# Patient Record
Sex: MALE | Race: WHITE | Employment: FULL TIME | URBAN - METROPOLITAN AREA
[De-identification: names, ages, dates, MRNs, and addresses within clinical notes are randomized per-mention and may not be internally consistent; named-entity substitution may affect disease eponyms.]

---

## 2021-02-12 ENCOUNTER — APPOINTMENT (OUTPATIENT)
Dept: GENERAL RADIOLOGY | Age: 66
End: 2021-02-12
Attending: EMERGENCY MEDICINE
Payer: OTHER MISCELLANEOUS

## 2021-02-12 ENCOUNTER — APPOINTMENT (OUTPATIENT)
Dept: CT IMAGING | Age: 66
End: 2021-02-12
Attending: NURSE PRACTITIONER
Payer: OTHER MISCELLANEOUS

## 2021-02-12 ENCOUNTER — APPOINTMENT (OUTPATIENT)
Dept: CT IMAGING | Age: 66
End: 2021-02-12
Attending: EMERGENCY MEDICINE
Payer: OTHER MISCELLANEOUS

## 2021-02-12 ENCOUNTER — HOSPITAL ENCOUNTER (OUTPATIENT)
Age: 66
Setting detail: OBSERVATION
Discharge: HOME | End: 2021-02-14
Attending: COLON & RECTAL SURGERY | Admitting: COLON & RECTAL SURGERY
Payer: OTHER MISCELLANEOUS

## 2021-02-12 DIAGNOSIS — S22.42XA MULTIPLE FRACTURES OF RIBS, LEFT SIDE, INITIAL ENCOUNTER FOR CLOSED FRACTURE: ICD-10-CM

## 2021-02-12 DIAGNOSIS — S22.42XA CLOSED FRACTURE OF MULTIPLE RIBS OF LEFT SIDE, INITIAL ENCOUNTER: ICD-10-CM
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
## 2021-02-12 DIAGNOSIS — S40.812A ABRASION OF LEFT UPPER EXTREMITY, INITIAL ENCOUNTER: ICD-10-CM

## 2021-02-12 LAB
ALBUMIN SERPL-MCNC: 3.6 G/DL (ref 3.5–5)
ALBUMIN/GLOB SERPL: 0.9 {RATIO} (ref 1.1–2.2)
ALP SERPL-CCNC: 99 U/L (ref 45–117)
ALT SERPL-CCNC: 18 U/L (ref 12–78)
ANION GAP SERPL CALC-SCNC: 6 MMOL/L (ref 5–15)
APPEARANCE UR: CLEAR
AST SERPL W P-5'-P-CCNC: 10 U/L (ref 15–37)
BACTERIA URNS QL MICRO: NEGATIVE /HPF
BASOPHILS # BLD: 0.1 K/UL (ref 0–0.1)
BASOPHILS NFR BLD: 0 % (ref 0–1)
BILIRUB SERPL-MCNC: 0.4 MG/DL (ref 0.2–1)
BILIRUB UR QL: NEGATIVE
BUN SERPL-MCNC: 21 MG/DL (ref 6–20)
BUN/CREAT SERPL: 20 (ref 12–20)
CA-I BLD-MCNC: 9.7 MG/DL (ref 8.5–10.1)
CHLORIDE SERPL-SCNC: 107 MMOL/L (ref 97–108)
CO2 SERPL-SCNC: 25 MMOL/L (ref 21–32)
COLOR UR: ABNORMAL
CREAT SERPL-MCNC: 1.03 MG/DL (ref 0.7–1.3)
DIFFERENTIAL METHOD BLD: ABNORMAL
EOSINOPHIL # BLD: 0.3 K/UL (ref 0–0.4)
EOSINOPHIL NFR BLD: 2 % (ref 0–7)
ERYTHROCYTE [DISTWIDTH] IN BLOOD BY AUTOMATED COUNT: 13.6 % (ref 11.5–14.5)
GLOBULIN SER CALC-MCNC: 3.8 G/DL (ref 2–4)
GLUCOSE SERPL-MCNC: 278 MG/DL (ref 65–100)
GLUCOSE UR STRIP.AUTO-MCNC: >300 MG/DL
HCT VFR BLD AUTO: 42.8 % (ref 36.6–50.3)
HGB BLD-MCNC: 14.5 G/DL (ref 12.1–17)
HGB UR QL STRIP: ABNORMAL
IMM GRANULOCYTES # BLD AUTO: 0.1 K/UL (ref 0–0.04)
IMM GRANULOCYTES NFR BLD AUTO: 0 % (ref 0–0.5)
KETONES UR QL STRIP.AUTO: NEGATIVE MG/DL
LEUKOCYTE ESTERASE UR QL STRIP.AUTO: NEGATIVE
LYMPHOCYTES # BLD: 2.7 K/UL (ref 0.8–3.5)
LYMPHOCYTES NFR BLD: 18 % (ref 12–49)
MCH RBC QN AUTO: 30.9 PG (ref 26–34)
MCHC RBC AUTO-ENTMCNC: 33.9 G/DL (ref 30–36.5)
MCV RBC AUTO: 91.1 FL (ref 80–99)
MONOCYTES # BLD: 1.1 K/UL (ref 0–1)
MONOCYTES NFR BLD: 7 % (ref 5–13)
MUCOUS THREADS URNS QL MICRO: ABNORMAL /LPF
NEUTS SEG # BLD: 11.5 K/UL (ref 1.8–8)
NEUTS SEG NFR BLD: 73 % (ref 32–75)
NITRITE UR QL STRIP.AUTO: NEGATIVE
PH UR STRIP: 6 [PH] (ref 5–8)
PLATELET # BLD AUTO: 191 K/UL (ref 150–400)
PMV BLD AUTO: 13.5 FL (ref 8.9–12.9)
POTASSIUM SERPL-SCNC: 4.1 MMOL/L (ref 3.5–5.1)
PROT SERPL-MCNC: 7.4 G/DL (ref 6.4–8.2)
PROT UR STRIP-MCNC: 30 MG/DL
RBC # BLD AUTO: 4.7 M/UL (ref 4.1–5.7)
RBC #/AREA URNS HPF: >100 /HPF (ref 0–5)
SODIUM SERPL-SCNC: 138 MMOL/L (ref 136–145)
SP GR UR REFRACTOMETRY: 1.02 (ref 1–1.03)
TROPONIN I SERPL-MCNC: <0.05 NG/ML
UROBILINOGEN UR QL STRIP.AUTO: 2 EU/DL (ref 0.1–1)
WBC # BLD AUTO: 15.6 K/UL (ref 4.1–11.1)
WBC URNS QL MICRO: ABNORMAL /HPF (ref 0–4)

## 2021-02-12 PROCEDURE — 73110 X-RAY EXAM OF WRIST: CPT

## 2021-02-12 PROCEDURE — 71250 CT THORAX DX C-: CPT

## 2021-02-12 PROCEDURE — 81001 URINALYSIS AUTO W/SCOPE: CPT

## 2021-02-12 PROCEDURE — 74011250636 HC RX REV CODE- 250/636: Performed by: EMERGENCY MEDICINE

## 2021-02-12 PROCEDURE — 99285 EMERGENCY DEPT VISIT HI MDM: CPT

## 2021-02-12 PROCEDURE — 90715 TDAP VACCINE 7 YRS/> IM: CPT | Performed by: NURSE PRACTITIONER

## 2021-02-12 PROCEDURE — 73090 X-RAY EXAM OF FOREARM: CPT

## 2021-02-12 PROCEDURE — 84484 ASSAY OF TROPONIN QUANT: CPT

## 2021-02-12 PROCEDURE — 96375 TX/PRO/DX INJ NEW DRUG ADDON: CPT

## 2021-02-12 PROCEDURE — 74011250636 HC RX REV CODE- 250/636: Performed by: NURSE PRACTITIONER

## 2021-02-12 PROCEDURE — 90471 IMMUNIZATION ADMIN: CPT

## 2021-02-12 PROCEDURE — 93005 ELECTROCARDIOGRAM TRACING: CPT

## 2021-02-12 PROCEDURE — 85025 COMPLETE CBC W/AUTO DIFF WBC: CPT

## 2021-02-12 PROCEDURE — 99218 HC RM OBSERVATION: CPT

## 2021-02-12 PROCEDURE — 96374 THER/PROPH/DIAG INJ IV PUSH: CPT

## 2021-02-12 PROCEDURE — 72131 CT LUMBAR SPINE W/O DYE: CPT

## 2021-02-12 PROCEDURE — 80053 COMPREHEN METABOLIC PANEL: CPT

## 2021-02-12 RX ORDER — HYDROMORPHONE HYDROCHLORIDE 1 MG/ML
1 INJECTION, SOLUTION INTRAMUSCULAR; INTRAVENOUS; SUBCUTANEOUS ONCE
Status: COMPLETED | OUTPATIENT
Start: 2021-02-12 | End: 2021-02-12

## 2021-02-12 RX ORDER — ONDANSETRON 2 MG/ML
4 INJECTION INTRAMUSCULAR; INTRAVENOUS
Status: COMPLETED | OUTPATIENT
Start: 2021-02-12 | End: 2021-02-12

## 2021-02-12 RX ORDER — PIOGLITAZONEHYDROCHLORIDE 45 MG/1
45 TABLET ORAL DAILY
COMMUNITY

## 2021-02-12 RX ORDER — SODIUM CHLORIDE 9 MG/ML
125 INJECTION, SOLUTION INTRAVENOUS CONTINUOUS
Status: DISCONTINUED | OUTPATIENT
Start: 2021-02-12 | End: 2021-02-14 | Stop reason: HOSPADM

## 2021-02-12 RX ORDER — CARVEDILOL 12.5 MG/1
12.5 TABLET ORAL 2 TIMES DAILY WITH MEALS
COMMUNITY

## 2021-02-12 RX ORDER — HYDROMORPHONE HYDROCHLORIDE 1 MG/ML
1 INJECTION, SOLUTION INTRAMUSCULAR; INTRAVENOUS; SUBCUTANEOUS
Status: COMPLETED | OUTPATIENT
Start: 2021-02-12 | End: 2021-02-13

## 2021-02-12 RX ORDER — ERGOCALCIFEROL 1.25 MG/1
50000 CAPSULE ORAL
COMMUNITY

## 2021-02-12 RX ORDER — HYDROMORPHONE HYDROCHLORIDE 1 MG/ML
0.5 INJECTION, SOLUTION INTRAMUSCULAR; INTRAVENOUS; SUBCUTANEOUS
Status: DISCONTINUED | OUTPATIENT
Start: 2021-02-12 | End: 2021-02-13

## 2021-02-12 RX ORDER — MORPHINE SULFATE 4 MG/ML
4 INJECTION INTRAVENOUS ONCE
Status: COMPLETED | OUTPATIENT
Start: 2021-02-12 | End: 2021-02-12

## 2021-02-12 RX ORDER — ONDANSETRON 2 MG/ML
4 INJECTION INTRAMUSCULAR; INTRAVENOUS
Status: DISCONTINUED | OUTPATIENT
Start: 2021-02-12 | End: 2021-02-14 | Stop reason: HOSPADM

## 2021-02-12 RX ORDER — METFORMIN HYDROCHLORIDE 500 MG/1
500 TABLET ORAL 2 TIMES DAILY WITH MEALS
COMMUNITY

## 2021-02-12 RX ADMIN — HYDROMORPHONE HYDROCHLORIDE 1 MG: 1 INJECTION, SOLUTION INTRAMUSCULAR; INTRAVENOUS; SUBCUTANEOUS at 20:59

## 2021-02-12 RX ADMIN — MORPHINE SULFATE 4 MG: 4 INJECTION INTRAVENOUS at 18:47

## 2021-02-12 RX ADMIN — TETANUS TOXOID, REDUCED DIPHTHERIA TOXOID AND ACELLULAR PERTUSSIS VACCINE, ADSORBED 0.5 ML: 5; 2.5; 8; 8; 2.5 SUSPENSION INTRAMUSCULAR at 23:53

## 2021-02-12 RX ADMIN — ONDANSETRON 4 MG: 2 INJECTION INTRAMUSCULAR; INTRAVENOUS at 18:45

## 2021-02-12 RX ADMIN — SODIUM CHLORIDE 125 ML/HR: 9 INJECTION, SOLUTION INTRAVENOUS at 23:53

## 2021-02-12 NOTE — ED TRIAGE NOTES
Fall from approx 3-4 ft platform to ground between truck, C/O of mid back pain with respirations. Abrasion to L forearm.

## 2021-02-12 NOTE — LETTER
NOTIFICATION OF RETURN TO WORK / SCHOOL 
 
2/14/2021 10:13 AM 
 
Mr. Juanita Emerson [address] [address] [place] To Whom It May Concern: 
 
Juanita Emerson was under the care of [facility] from February 12, 2021 to February 14, 2021. He will be able to return to work/school on March 29, [address] with no restrictions assuming his recovery is uneventful. [place] If there are questions or concerns please contact our office. Sincerely, Laurel Martino MD PhD [facility] 
 
[phone]

## 2021-02-13 ENCOUNTER — APPOINTMENT (OUTPATIENT)
Dept: GENERAL RADIOLOGY | Age: 66
End: 2021-02-13
Attending: NURSE PRACTITIONER
Payer: OTHER MISCELLANEOUS

## 2021-02-13 ENCOUNTER — APPOINTMENT (OUTPATIENT)
Dept: CT IMAGING | Age: 66
End: 2021-02-13
Attending: COLON & RECTAL SURGERY
Payer: OTHER MISCELLANEOUS

## 2021-02-13 LAB
GLUCOSE BLD STRIP.AUTO-MCNC: 120 MG/DL (ref 65–100)
GLUCOSE BLD STRIP.AUTO-MCNC: 152 MG/DL (ref 65–100)
GLUCOSE BLD STRIP.AUTO-MCNC: 266 MG/DL (ref 65–100)
GLUCOSE BLD STRIP.AUTO-MCNC: 270 MG/DL (ref 65–100)
GLUCOSE BLD STRIP.AUTO-MCNC: 276 MG/DL (ref 65–100)
PERFORMED BY, TECHID: ABNORMAL

## 2021-02-13 PROCEDURE — 82962 GLUCOSE BLOOD TEST: CPT

## 2021-02-13 PROCEDURE — 71045 X-RAY EXAM CHEST 1 VIEW: CPT

## 2021-02-13 PROCEDURE — 74011000636 HC RX REV CODE- 636: Performed by: COLON & RECTAL SURGERY

## 2021-02-13 PROCEDURE — 74011636637 HC RX REV CODE- 636/637: Performed by: COLON & RECTAL SURGERY

## 2021-02-13 PROCEDURE — 71260 CT THORAX DX C+: CPT

## 2021-02-13 PROCEDURE — 99218 HC RM OBSERVATION: CPT

## 2021-02-13 PROCEDURE — 74011250636 HC RX REV CODE- 250/636: Performed by: NURSE PRACTITIONER

## 2021-02-13 PROCEDURE — 96376 TX/PRO/DX INJ SAME DRUG ADON: CPT

## 2021-02-13 PROCEDURE — 94762 N-INVAS EAR/PLS OXIMTRY CONT: CPT

## 2021-02-13 PROCEDURE — 74011250637 HC RX REV CODE- 250/637: Performed by: COLON & RECTAL SURGERY

## 2021-02-13 PROCEDURE — 99218 PR INITIAL OBSERVATION CARE/DAY 30 MINUTES: CPT | Performed by: COLON & RECTAL SURGERY

## 2021-02-13 RX ORDER — PIOGLITAZONEHYDROCHLORIDE 15 MG/1
45 TABLET ORAL DAILY
Status: DISCONTINUED | OUTPATIENT
Start: 2021-02-13 | End: 2021-02-14 | Stop reason: HOSPADM

## 2021-02-13 RX ORDER — METFORMIN HYDROCHLORIDE 500 MG/1
500 TABLET ORAL 2 TIMES DAILY WITH MEALS
Status: DISCONTINUED | OUTPATIENT
Start: 2021-02-13 | End: 2021-02-14 | Stop reason: HOSPADM

## 2021-02-13 RX ORDER — CARVEDILOL 12.5 MG/1
12.5 TABLET ORAL 2 TIMES DAILY WITH MEALS
Status: DISCONTINUED | OUTPATIENT
Start: 2021-02-13 | End: 2021-02-14 | Stop reason: HOSPADM

## 2021-02-13 RX ORDER — OXYCODONE HYDROCHLORIDE 10 MG/1
10 TABLET ORAL
Status: DISCONTINUED | OUTPATIENT
Start: 2021-02-13 | End: 2021-02-14 | Stop reason: HOSPADM

## 2021-02-13 RX ORDER — MAGNESIUM SULFATE 100 %
4 CRYSTALS MISCELLANEOUS AS NEEDED
Status: DISCONTINUED | OUTPATIENT
Start: 2021-02-13 | End: 2021-02-14 | Stop reason: HOSPADM

## 2021-02-13 RX ORDER — OXYCODONE HYDROCHLORIDE 5 MG/1
5 TABLET ORAL
Status: DISCONTINUED | OUTPATIENT
Start: 2021-02-13 | End: 2021-02-14 | Stop reason: HOSPADM

## 2021-02-13 RX ORDER — DEXTROSE 50 % IN WATER (D50W) INTRAVENOUS SYRINGE
25-50 AS NEEDED
Status: DISCONTINUED | OUTPATIENT
Start: 2021-02-13 | End: 2021-02-14 | Stop reason: HOSPADM

## 2021-02-13 RX ORDER — INSULIN LISPRO 100 [IU]/ML
INJECTION, SOLUTION INTRAVENOUS; SUBCUTANEOUS
Status: DISCONTINUED | OUTPATIENT
Start: 2021-02-13 | End: 2021-02-14 | Stop reason: HOSPADM

## 2021-02-13 RX ADMIN — METFORMIN HYDROCHLORIDE 500 MG: 500 TABLET ORAL at 10:32

## 2021-02-13 RX ADMIN — HYDROMORPHONE HYDROCHLORIDE 1 MG: 1 INJECTION, SOLUTION INTRAMUSCULAR; INTRAVENOUS; SUBCUTANEOUS at 02:09

## 2021-02-13 RX ADMIN — INSULIN LISPRO 3 UNITS: 100 INJECTION, SOLUTION INTRAVENOUS; SUBCUTANEOUS at 12:53

## 2021-02-13 RX ADMIN — INSULIN LISPRO 3 UNITS: 100 INJECTION, SOLUTION INTRAVENOUS; SUBCUTANEOUS at 10:32

## 2021-02-13 RX ADMIN — CARVEDILOL 12.5 MG: 12.5 TABLET, FILM COATED ORAL at 18:04

## 2021-02-13 RX ADMIN — SODIUM CHLORIDE 125 ML/HR: 9 INJECTION, SOLUTION INTRAVENOUS at 15:16

## 2021-02-13 RX ADMIN — CARVEDILOL 12.5 MG: 12.5 TABLET, FILM COATED ORAL at 10:32

## 2021-02-13 RX ADMIN — OXYCODONE HYDROCHLORIDE 10 MG: 10 TABLET ORAL at 18:08

## 2021-02-13 RX ADMIN — METFORMIN HYDROCHLORIDE 500 MG: 500 TABLET ORAL at 18:04

## 2021-02-13 RX ADMIN — HYDROMORPHONE HYDROCHLORIDE 1 MG: 1 INJECTION, SOLUTION INTRAMUSCULAR; INTRAVENOUS; SUBCUTANEOUS at 07:39

## 2021-02-13 RX ADMIN — IOPAMIDOL 100 ML: 755 INJECTION, SOLUTION INTRAVENOUS at 15:46

## 2021-02-13 RX ADMIN — INSULIN LISPRO 1 UNITS: 100 INJECTION, SOLUTION INTRAVENOUS; SUBCUTANEOUS at 18:03

## 2021-02-13 RX ADMIN — PIOGLITAZONE 45 MG: 15 TABLET ORAL at 12:18

## 2021-02-13 NOTE — ED NOTES
TRANSFER - OUT REPORT:    Verbal report given to Ulises Branch RN on [employer]  being transferred to [address] [place] [address]. Report consisted of patients Situation, Background, Assessment and   Recommendations(SBAR). Information from the following report(s) SBAR, ED Summary, [facility] and Recent Results was reviewed with the receiving nurse. Lines:   Peripheral IV 02/12/21 Right; Lower Forearm (Active)   Site Assessment Clean, dry, & intact 02/12/21 1844   Phlebitis Assessment 0 02/12/21 1844   Infiltration Assessment 0 02/12/21 1844   Dressing Status Clean, dry, & intact 02/12/21 1844   Dressing Type Tape;Transparent 02/12/21 1844   Hub Color/Line Status Pink 02/12/21 1844        Opportunity for questions and clarification was provided.

## 2021-02-13 NOTE — ROUTINE PROCESS
Pt refused inpatient and outpatient treatment and counseling for smoking cessation. Patient was given the -[phone] number for Shar Thayer.

## 2021-02-13 NOTE — ROUTINE PROCESS
BEDSIDE_VERBAL  shift change report given to Adeel Summers RN (oncoming nurse) by Mena Jeffery RN (offgoing nurse).  Report included the following information from the SBAR and night assessment

## 2021-02-13 NOTE — ED PROVIDER NOTES
EMERGENCY DEPARTMENT HISTORY AND PHYSICAL EXAM      Date: 2/12/2021  Patient Name: Juanita Emerson      History of Presenting Illness     Chief Complaint   Patient presents with   [facility] Fall    Back Pain       History Provided By: Patient    HPI: Juanita Emerson, 63 y.o. male with a past medical history significant No significant past medical history presents to the ED with cc of left thoracic back pain s/p fall. 10/10 constant sharp aggravated with taking a deep breath and movement. patient reports was pulled into the garage bay when he stepped off the trailer onto the ramp and slipped and fell between the trailer and the ramp. He reports fall was about 4 feet from the ground was wearing his hard hat. Denies LOC or hitting his head however fell backwards on his back was unable to get up or stand at the time complains of shortness of breath due to pain. He reports calling out for assistance when another  found him on the ground. Patient reports working for [employer] during time of incident. Denies any fever, chills, nausea, vomiting, abdominal pain. Off note pt current smoker 1ppd    There are no other complaints, changes, or physical findings at this time. PCP: UNKNOWN    Current Facility-Administered Medications   Medication Dose Route Frequency Provider Last Rate Last Admin    0.9% sodium chloride infusion  125 mL/hr IntraVENous CONTINUOUS Cintia Merritt NP        HYDROmorphone (PF) (DILAUDID) injection 0.5 mg  0.5 mg IntraVENous Q4H PRN Elbert Merritt NP        HYDROmorphone (DILAUDID) injection 1 mg  1 mg IntraVENous Q4H PRN Elbert Merritt NP        ondansetron (ZOFRAN) injection 4 mg  4 mg IntraVENous Q4H PRN Edna Fagan NP         Current Outpatient Medications   Medication Sig Dispense Refill    metFORMIN (GLUCOPHAGE) 500 mg tablet Take 500 mg by mouth two (2) times daily (with meals).  pioglitazone (Actos) 45 mg tablet Take 45 mg by mouth daily.       ergocalciferol (Vitamin D2) 1,250 mcg (50,000 unit) capsule Take 50,000 Units by mouth every seven (7) days.  carvediloL (COREG) 12.5 mg tablet Take 12.5 mg by mouth two (2) times daily (with meals). Indications: myocardial reinfarction prevention         Past History     Past Medical History:  Past Medical History:   Diagnosis Date    Diabetes ([facility]     MI (myocardial infarction) ([facility]        Past Surgical History:  Past Surgical History:   Procedure Laterality Date    HX CORONARY STENT PLACEMENT      3 stents       Family History:  History reviewed. No pertinent family history. Social History:  Social History     Tobacco Use    Smoking status: Current Every Day Smoker     Packs/day: 1.00    Smokeless tobacco: Never Used   Substance Use Topics    Alcohol use: Never     Frequency: Never    Drug use: Never       Allergies:  No Known Allergies      Review of Systems     Review of Systems   Constitutional: Negative for chills and fever. Respiratory: Positive for shortness of breath. Negative for wheezing. Cardiovascular: Negative for chest pain. Gastrointestinal: Negative for nausea and vomiting. Genitourinary: Negative. Musculoskeletal: Positive for back pain. Skin: Negative. Physical Exam     Physical Exam  Constitutional:       General: He is in acute distress. Appearance: Normal appearance. HENT:      Head: Normocephalic and atraumatic. Nose: Nose normal.      Mouth/Throat:      Mouth: Mucous membranes are moist.   Eyes:      Extraocular Movements: Extraocular movements intact. Pupils: Pupils are equal, round, and reactive to light. Neck:      Musculoskeletal: Normal range of motion and neck supple. No muscular tenderness. Cardiovascular:      Rate and Rhythm: Normal rate and regular rhythm. Pulses: Normal pulses. Pulmonary:      Effort: Pulmonary effort is normal. No respiratory distress. Breath sounds: Rhonchi present.    Abdominal:      General: Bowel sounds are normal.      Palpations: Abdomen is soft. Musculoskeletal:        Arms:    Skin:     General: Skin is warm and dry. Capillary Refill: Capillary refill takes less than 2 seconds. Findings: Wound present. No bruising or erythema. Neurological:      Mental Status: He is alert and oriented to person, place, and time. Lab and Diagnostic Study Results     Labs -     Recent Results (from the past 12 hour(s))   CBC WITH AUTOMATED DIFF    Collection Time: 02/12/21  8:00 PM   Result Value Ref Range    WBC 15.6 (H) 4.1 - 11.1 K/uL    RBC 4.70 4. 10 - 5.70 M/uL    HGB 14.5 12.1 - 17.0 g/dL    HCT 42.8 36.6 - 50.3 %    MCV 91.1 80.0 - 99.0 FL    MCH 30.9 26.0 - 34.0 PG    MCHC 33.9 30.0 - 36.5 g/dL    RDW 13.6 11.5 - 14.5 %    PLATELET 415 438 - 223 K/uL    MPV 13.5 (H) 8.9 - 12.9 FL    NEUTROPHILS 73 32 - 75 %    LYMPHOCYTES 18 12 - 49 %    MONOCYTES 7 5 - 13 %    EOSINOPHILS 2 0 - 7 %    BASOPHILS 0 0 - 1 %    IMMATURE GRANULOCYTES 0 0.0 - 0.5 %    ABS. NEUTROPHILS 11.5 (H) 1.8 - 8.0 K/UL    ABS. LYMPHOCYTES 2.7 0.8 - 3.5 K/UL    ABS. MONOCYTES 1.1 (H) 0.0 - 1.0 K/UL    ABS. EOSINOPHILS 0.3 0.0 - 0.4 K/UL    ABS. BASOPHILS 0.1 0.0 - 0.1 K/UL    ABS. IMM.  GRANS. 0.1 (H) 0.00 - 0.04 K/UL    DF AUTOMATED     TROPONIN I    Collection Time: 02/12/21  8:00 PM   Result Value Ref Range    Troponin-I, Qt. <0.05 <6.29 ng/mL   METABOLIC PANEL, COMPREHENSIVE    Collection Time: 02/12/21  8:00 PM   Result Value Ref Range    Sodium 138 136 - 145 mmol/L    Potassium 4.1 3.5 - 5.1 mmol/L    Chloride 107 97 - 108 mmol/L    CO2 25 21 - 32 mmol/L    Anion gap 6 5 - 15 mmol/L    Glucose 278 (H) 65 - 100 mg/dL    BUN 21 (H) 6 - 20 mg/dL    Creatinine 1.03 0.70 - 1.30 mg/dL    BUN/Creatinine ratio 20 12 - 20      GFR est AA >60 >60 ml/min/1.73m2    GFR est non-AA >60 >60 ml/min/1.73m2    Calcium 9.7 8.5 - 10.1 mg/dL    Bilirubin, total 0.4 0.2 - 1.0 mg/dL    AST (SGOT) 10 (L) 15 - 37 U/L    ALT (SGPT) 18 12 - 78 U/L    Alk. phosphatase 99 45 - 117 U/L    Protein, total 7.4 6.4 - 8.2 g/dL    Albumin 3.6 3.5 - 5.0 g/dL    Globulin 3.8 2.0 - 4.0 g/dL    A-G Ratio 0.9 (L) 1.1 - 2.2         Radiologic Studies -   [unfilled]  CT Results  (Last 48 hours)               02/12/21 2014  CT CHEST WO CONT Final result    Impression:  1. Nondisplaced fractures of left posterior ninth, 10th, 11th, 12th ribs. 2. Atherosclerosis. 3. Emphysema. Narrative:  Comparison: No comparison. History: Fall. Technique: Axial imaging chest without IV contrast,  with multiplanar formatting   and MIPs. Dose reduction: All CT scans at this facility are performed using dose reduction   optimization techniques as appropriate to a performed exam including the   following: Automated exposure control, adjustments of the mA and/or kV according   to patient's size, or use of iterative reconstruction technique. Findings:       Heart: Normal heart size. Moderate to severe multivessel coronary artery   calcification. No pericardial effusion. Thoracic Aorta: Calcific atherosclerosis. No aneurysm. Pulmonary arteries: Nondilated. Adenopathy: No mediastinal or hilar adenopathy. Thoracic esophagus:Collapsed. Lung parenchyma: Paraseptal emphysema. .       Central airways: Patent. Pleural effusion: None. Chest Wall: No axillary adenopathy. Included thyroid unremarkable. No   significant superficial soft tissue swelling. Upper abdomen:  Cholecystectomy. Calcific atherosclerosis. Bones: Nondisplaced fractures of left posterior ninth, 10th, 11th, 12th ribs. Degenerative disease of the spine. 02/12/21 2013  CT SPINE THORAC LUMB WO CONT Final result    Impression:  1. No acute fracture of the thoracic or lumbar spine. 2.  Posterior left 9-12th rib fractures with adjacent left pleural fluid, likely   hemothorax. Soft tissue stranding within the adjacent lung.  Findings will be   described in detail on dedicated CT chest.   3.  Soft tissue stranding within the subcutaneous tissues at the lower lumbar   spine and sacrum, eccentric to the left, likely posttraumatic soft tissue   contusion. Narrative:  CT OF THE THORACIC SPINE WITHOUT INTRAVENOUS CONTRAST   CT OF THE LUMBAR SPINE WITHOUT INTRAVENOUS CONTRAST       INDICATION: Back pain, fall       TECHNIQUE: Thin section transaxial images were obtained through the thoracic and   lumbar spine. Sagittal and coronal reformations were performed. Dose reduction:   Per department policy, all CT scans at this facility are performed using dose   reduction optimization techniques as appropriate to a performed examination   including the following: Automated exposure control, adjustments of the mA   and/or KV according to patient size, or use of iterative reconstruction   technique. FINDINGS:   Multilevel cervical spondylosis is noted with prominent anterolateral   osteophytes. Thoracic spine   Vertebral body heights are normal. Intervertebral disc heights are preserved. Multilevel anterolateral osteophytes of the mid thoracic spine are seen. No   aggressive osseous lesions. Nondisplaced fracture of the posterior 9th, 10th,   11th and 12th ribs seen with small amount of adjacent pleural fluid and   groundglass opacity of the lungs. Evaluation of the included chest also shows   high density of the coronary vessels, likely from atherosclerotic calcification   and/or stones. Calcified atherosclerosis of the aorta and branches. Mild   paraseptal emphysema of the lung apices. Lumbar spine   Vertebral body heights are normal. Intervertebral disc heights are preserved. No   acute fracture or subluxation. No aggressive osseous lesions. Degenerative   changes of the lumbar spine causes moderate bilateral neural foraminal narrowing   at L5-S1.  Significant soft tissue stranding is seen within the subcutaneous   tissues of the lower lumbar spine and sacrum posteriorly, likely traumatic   contusion. Degenerative changes of the sacroiliac joints bilaterally. CXR Results  (Last 48 hours)    None          Medical Decision Making and ED Course   - I am the first and primary provider for this patient AND AM THE PRIMARY PROVIDER OF RECORD. - I reviewed the vital signs, available nursing notes, past medical history, past surgical history, family history and social history. - Initial assessment performed. The patients presenting problems have been discussed, and the staff are in agreement with the care plan formulated and outlined with them. I have encouraged them to ask questions as they arise throughout their visit. Vital Signs-Reviewed the patient's vital signs. Patient Vitals for the past 12 hrs:   Temp Pulse Resp BP SpO2   02/12/21 2030  86 20 125/66 97 %   02/12/21 1823 98.1 °F (36.7 °C) 83 20 139/81 97 %       Component Value Ref Range & Units Status   Ventricular Rate 86  BPM Final   Atrial Rate 86  BPM Final   P-R Interval 154  ms Final   QRS Duration 92  ms Final   Q-T Interval 354  ms Final   QTC Calculation (Bezet) 423  ms Final   Calculated P Axis 71  degrees Final   Calculated R Axis 66  degrees Final   Calculated T Axis 47  degrees Final   Diagnosis   Final   Normal sinus rhythm   Normal ECG   No previous ECGs available   Confirmed by DEBBI YUN, Abdi Gimenez ([id number]) on 2/14/2021 12:34:46 PM          The patient presents with fall with a differential diagnosis of fracture, pneumothorax    ED Course:              Provider Notes (Medical Decision Making):   TDAP given. Patient with multiple rib fractures noted consult to general surgeon. pt admitted for pain controll. Stable at this time          Consultations:       Consultations: Dr. Yves Wei (General Surgeon.)        Procedures and [address],         Disposition     Disposition:     Admitted    DISCHARGE PLAN:  1.    Current Discharge Medication List      CONTINUE these medications which have NOT CHANGED    Details   metFORMIN (GLUCOPHAGE) 500 mg tablet Take 500 mg by mouth two (2) times daily (with meals). pioglitazone (Actos) 45 mg tablet Take 45 mg by mouth daily. ergocalciferol (Vitamin D2) 1,250 mcg (50,000 unit) capsule Take 50,000 Units by mouth every seven (7) days. carvediloL (COREG) 12.5 mg tablet Take 12.5 mg by mouth two (2) times daily (with meals). Indications: myocardial reinfarction prevention           2. Follow-up Information    None       3. Return to ED if worse   4. Current Discharge Medication List          Diagnosis     Clinical Impression:   1. Fall, initial encounter    2. Closed fracture of multiple ribs of left side, initial encounter        Attestations:    Roseann Cutler NP    Please note that this dictation was completed with [employer] [facility], the computer voice recognition software. Quite often unanticipated grammatical, syntax, homophones, and other interpretive errors are inadvertently transcribed by the computer software. Please disregard these errors. Please excuse any errors that have escaped final proofreading. Thank you.

## 2021-02-13 NOTE — ROUTINE PROCESS
2 person skin assessment completed by Renu Seymour RN and Emigdio Maradiaga RN. Abrasion noted to left arm just below the elbow and an abrasion on the left wrist.  Abrasions were rinsed, covered with ABD pad and wrapped with kerlex. No other wounds or bruising noted.

## 2021-02-13 NOTE — H&P
History and Physical    Subjective:     Alida Chavez is a 63 y.o.  male who presents emergency department complaints of left thoracic/back pain. He fell approximately 4 feet off of a trailer onto a ramp and slipped and fell between the trailer and the ramp. He was wearing a hard hat. He fell on his back and immediately experienced shortness of breath and pain. He also complained of some left arm pain. He had x-rays of the left forearm and left wrist which were negative. He had a CT of the thoracic and lumbar spine which demonstrated no acute fracture of the thoracic or lumbar spine. Posterior left ninth through 12th rib fractures were noted with adjacent pleural fluid. Was also noted there was soft tissue stranding in the subcutaneous tissue the lower lumbar sprain and sacrum  on the left side consistent with posttraumatic soft tissue contusion. He also had a CT of the chest which demonstrated moderate to severe multivessel coronary artery calcification, emphysema of the lungs, nondisplaced fractures of the left posterior ninth 10th 11th and 12th ribs. I personally reviewed the images myself. Is also trace fluid in the left side at the site of injury with what appears to be a very small pulmonary contusion. The patient's past medical history seen for history of diabetes for which she is on Metformin and Actos. He gives a history of mild which she states was in the remote past.  He has had a PTCA with stents. He also has a history of hypertension. He is on carvedilol. This a.m. the patient reports pain at the site of his rib fractures. He denies shortness of breath. He does report pain on deep inspiration. Past Medical History:   Diagnosis Date    Diabetes ([address]     MI (myocardial infarction) ([address]       Past Surgical History:   Procedure Laterality Date    HX CORONARY STENT PLACEMENT      3 stents     History reviewed. No pertinent family history.    Social History Tobacco Use    Smoking status: Current Every Day Smoker     Packs/day: 1.00    Smokeless tobacco: Never Used   Substance Use Topics    Alcohol use: Never     Frequency: Never       Prior to Admission medications    Medication Sig Start Date End Date Taking? Authorizing Provider   metFORMIN (GLUCOPHAGE) 500 mg tablet Take 500 mg by mouth two (2) times daily (with meals). Yes Other, MD Rafy   pioglitazone (Actos) 45 mg tablet Take 45 mg by mouth daily. Yes Nadeem, MD Rafy   ergocalciferol (Vitamin D2) 1,250 mcg (50,000 unit) capsule Take 50,000 Units by mouth every seven (7) days. Yes Other, MD Rafy   carvediloL (COREG) 12.5 mg tablet Take 12.5 mg by mouth two (2) times daily (with meals). Indications: myocardial reinfarction prevention   Yes Other, MD Rafy     No Known Allergies     Review of Systems:  A comprehensive review of systems was negative except for that written in the History of Present Illness. Objective: Intake and Output:    No intake/output data recorded. No intake/output data recorded. Physical Exam:   Physical Exam  Constitutional:       General: He is not in acute distress. Appearance: He is not ill-appearing. Comments: Patient had recently received medication was slightly drowsy at my examination   HENT:      Head: Normocephalic and atraumatic. Neck:      Musculoskeletal: Normal range of motion and neck supple. Cardiovascular:      Rate and Rhythm: Normal rate and regular rhythm. Heart sounds: Normal heart sounds. Pulmonary:      Comments: Coarse breath sounds bilateral, breath sounds equal bilateral.  Chest wall tenderness in the left lower posterior chest and left lower lateral chest  Abdominal:      General: There is no distension. Palpations: Abdomen is soft. Tenderness: There is no abdominal tenderness. Musculoskeletal: Normal range of motion. Skin:     General: Skin is dry. Neurological:      General: No focal deficit present. Mental Status: He is oriented to person, place, and time. Psychiatric:         Mood and Affect: Mood normal.         Thought Content: Thought content normal.         Judgment: Judgment normal.           ECG:     Data Review:   Recent Results (from the past 24 hour(s))   CBC WITH AUTOMATED DIFF    Collection Time: 02/12/21  8:00 PM   Result Value Ref Range    WBC 15.6 (H) 4.1 - 11.1 K/uL    RBC 4.70 4. 10 - 5.70 M/uL    HGB 14.5 12.1 - 17.0 g/dL    HCT 42.8 36.6 - 50.3 %    MCV 91.1 80.0 - 99.0 FL    MCH 30.9 26.0 - 34.0 PG    MCHC 33.9 30.0 - 36.5 g/dL    RDW 13.6 11.5 - 14.5 %    PLATELET 842 540 - 087 K/uL    MPV 13.5 (H) 8.9 - 12.9 FL    NEUTROPHILS 73 32 - 75 %    LYMPHOCYTES 18 12 - 49 %    MONOCYTES 7 5 - 13 %    EOSINOPHILS 2 0 - 7 %    BASOPHILS 0 0 - 1 %    IMMATURE GRANULOCYTES 0 0.0 - 0.5 %    ABS. NEUTROPHILS 11.5 (H) 1.8 - 8.0 K/UL    ABS. LYMPHOCYTES 2.7 0.8 - 3.5 K/UL    ABS. MONOCYTES 1.1 (H) 0.0 - 1.0 K/UL    ABS. EOSINOPHILS 0.3 0.0 - 0.4 K/UL    ABS. BASOPHILS 0.1 0.0 - 0.1 K/UL    ABS. IMM. GRANS. 0.1 (H) 0.00 - 0.04 K/UL    DF AUTOMATED     TROPONIN I    Collection Time: 02/12/21  8:00 PM   Result Value Ref Range    Troponin-I, Qt. <0.05 <4.97 ng/mL   METABOLIC PANEL, COMPREHENSIVE    Collection Time: 02/12/21  8:00 PM   Result Value Ref Range    Sodium 138 136 - 145 mmol/L    Potassium 4.1 3.5 - 5.1 mmol/L    Chloride 107 97 - 108 mmol/L    CO2 25 21 - 32 mmol/L    Anion gap 6 5 - 15 mmol/L    Glucose 278 (H) 65 - 100 mg/dL    BUN 21 (H) 6 - 20 mg/dL    Creatinine 1.03 0.70 - 1.30 mg/dL    BUN/Creatinine ratio 20 12 - 20      GFR est AA >60 >60 ml/min/1.73m2    GFR est non-AA >60 >60 ml/min/1.73m2    Calcium 9.7 8.5 - 10.1 mg/dL    Bilirubin, total 0.4 0.2 - 1.0 mg/dL    AST (SGOT) 10 (L) 15 - 37 U/L    ALT (SGPT) 18 12 - 78 U/L    Alk.  phosphatase 99 45 - 117 U/L    Protein, total 7.4 6.4 - 8.2 g/dL    Albumin 3.6 3.5 - 5.0 g/dL    Globulin 3.8 2.0 - 4.0 g/dL    A-G Ratio 0.9 (L) 1.1 - 2.2 URINALYSIS W/MICROSCOPIC    Collection Time: 02/12/21 10:20 PM   Result Value Ref Range    Color Yellow/Straw      Appearance Clear Clear      Specific gravity 1.017 1.003 - 1.030      pH (UA) 6.0 5.0 - 8.0      Protein 30 (A) Negative mg/dL    Glucose >300 (A) Negative mg/dL    Ketone Negative Negative mg/dL    Bilirubin Negative Negative      Blood Moderate (A) Negative      Urobilinogen 2.0 (H) 0.1 - 1.0 EU/dL    Nitrites Negative Negative      Leukocyte Esterase Negative Negative      WBC 0-4 0 - 4 /hpf    RBC >100 (H) 0 - 5 /hpf    Bacteria Negative Negative /hpf    Mucus Trace /lpf   GLUCOSE, POC    Collection Time: 02/13/21  1:33 AM   Result Value Ref Range    Glucose (POC) 276 (H) 65 - 100 mg/dL    Performed by Claire Serrano        Chest x-ray chest x-ray pending    Assessment:     Active Problems:    Multiple fractures of ribs, left side, initial encounter for closed fracture (2/12/2021)        Plan:   61-year-old male with past medical history of hypertension, diabetes, MI status post stent placement in the past who suffered posterior ninth through 12 rib fractures on the left side after falling at work between trailer and ramp.   Admitted for pain management  Incentive spirometry  We will obtain EKG rule out any acute changes given his history  Follow-up chest x-ray

## 2021-02-14 VITALS
DIASTOLIC BLOOD PRESSURE: 76 MMHG | HEART RATE: 69 BPM | WEIGHT: 230 LBS | SYSTOLIC BLOOD PRESSURE: 141 MMHG | BODY MASS INDEX: 34.07 KG/M2 | RESPIRATION RATE: 18 BRPM | TEMPERATURE: 98.2 F | HEIGHT: 69 IN | OXYGEN SATURATION: 94 %

## 2021-02-14 LAB
ATRIAL RATE: 86 BPM
CALCULATED P AXIS, ECG09: 71 DEGREES
CALCULATED R AXIS, ECG10: 66 DEGREES
CALCULATED T AXIS, ECG11: 47 DEGREES
DIAGNOSIS, 93000: NORMAL
GLUCOSE BLD STRIP.AUTO-MCNC: 161 MG/DL (ref 65–100)
P-R INTERVAL, ECG05: 154 MS
PERFORMED BY, TECHID: ABNORMAL
Q-T INTERVAL, ECG07: 354 MS
QRS DURATION, ECG06: 92 MS
QTC CALCULATION (BEZET), ECG08: 423 MS
VENTRICULAR RATE, ECG03: 86 BPM

## 2021-02-14 PROCEDURE — 74011636637 HC RX REV CODE- 636/637: Performed by: COLON & RECTAL SURGERY

## 2021-02-14 PROCEDURE — 74011250637 HC RX REV CODE- 250/637: Performed by: COLON & RECTAL SURGERY

## 2021-02-14 PROCEDURE — 99218 HC RM OBSERVATION: CPT

## 2021-02-14 PROCEDURE — 99217 PR OBSERVATION CARE DISCHARGE MANAGEMENT: CPT | Performed by: COLON & RECTAL SURGERY

## 2021-02-14 PROCEDURE — 82962 GLUCOSE BLOOD TEST: CPT

## 2021-02-14 PROCEDURE — 74011250636 HC RX REV CODE- 250/636: Performed by: NURSE PRACTITIONER

## 2021-02-14 RX ORDER — OXYCODONE HYDROCHLORIDE 5 MG/1
5 TABLET ORAL
Qty: 30 TAB | Refills: 0 | Status: SHIPPED | OUTPATIENT
Start: 2021-02-14 | End: 2021-02-19

## 2021-02-14 RX ADMIN — METFORMIN HYDROCHLORIDE 500 MG: 500 TABLET ORAL at 08:43

## 2021-02-14 RX ADMIN — INSULIN LISPRO 1 UNITS: 100 INJECTION, SOLUTION INTRAVENOUS; SUBCUTANEOUS at 08:43

## 2021-02-14 RX ADMIN — CARVEDILOL 12.5 MG: 12.5 TABLET, FILM COATED ORAL at 08:43

## 2021-02-14 RX ADMIN — SODIUM CHLORIDE 125 ML/HR: 9 INJECTION, SOLUTION INTRAVENOUS at 01:51

## 2021-02-14 RX ADMIN — PIOGLITAZONE 45 MG: 15 TABLET ORAL at 08:43

## 2021-02-14 NOTE — PROGRESS NOTES
Discharge plan of care/case management plan validated with provider discharge order. Pt. Discharge home to selfcare. Pt. Iv removed. Pt. Provided with discharge information . Pt provided with prescriptions. Pt provided verbal and written consent. Pt. Left via cab . All belongings taken with patient.

## 2021-02-14 NOTE — PROGRESS NOTES
Bedside and Verbal shift change report given to Olen Sandhoff (oncoming nurse) by [address] [facility] [address], RN (offgoing nurse).  Report included the following information SBAR, Kardex, Intake/Output, MAR and Recent Results.

## 2021-02-14 NOTE — DISCHARGE SUMMARY
Admit date: 2/12/2021   Admitting Provider: Samina Souza MD    Discharge date: 2/14/2021  Discharging Provider: Samina Souza MD      * Admission Diagnoses: Multiple fractures of ribs, left side, initial encounter for closed fracture [S22.42XA]    * Discharge Diagnoses:  same  Hospital Problems as of 2/14/2021 Never Reviewed          Codes Class Noted - Resolved POA    Multiple fractures of ribs, left side, initial encounter for closed fracture ICD-10-CM: S22.42XA  ICD-9-CM: 807.09  2/12/2021 - Present Unknown              * Hospital Course: Patient is 81-year-old male who presents emergency department after falling 4 feet off of a trailer onto a ramp at work. He fell on his back and on arrival complained of left thoracic and back pain. He stated that he did have shortness of breath after he fell. He also complained of some left arm pain. X-rays of the left forearm and wrist were negative. He had a CT of the thoracic and lumbar spine which demonstrated no acute fracture of the thoracic or lumbar spine however did demonstrate left ninth through 12th rib fractures. He did not want to have a CT of the chest which demonstrated moderate to severe multivessel coronary artery calcification, emphysema, nondisplaced fractures of left posterior 9, 10, 11,12 ribs. He was admitted for pain management. He will start incentive spirometer. The next day he had a repeat chest x-ray which demonstrated no pneumothorax or pleural effusion. It was noted that he has some cardiopericardial enlargement and mild upper mediastinal widening. He had a CTA of the chest because of this and no vascularinjury noted. * Procedures: none  * No surgery found *      Consults: None    Significant Diagnostic Studies: Radiographic studies refer to HPI    Discharge Exam:  Physical Exam  Constitutional:       General: He is not in acute distress. Appearance: Normal appearance. He is not ill-appearing.    HENT:      Head: Normocephalic and atraumatic. Neck:      Musculoskeletal: Normal range of motion and neck supple. Cardiovascular:      Rate and Rhythm: Normal rate and regular rhythm. Heart sounds: Normal heart sounds. Pulmonary:      Effort: Pulmonary effort is normal.      Breath sounds: Normal breath sounds. Comments: Breath sounds equal bilateral, tenderness to palpation left lower lateral and posterior chest wall  Abdominal:      General: There is no distension. Palpations: Abdomen is soft. Tenderness: There is no abdominal tenderness. Musculoskeletal: Normal range of motion. Skin:     General: Skin is warm and dry. Neurological:      General: No focal deficit present. Mental Status: He is alert and oriented to person, place, and time. Psychiatric:         Mood and Affect: Mood normal.         Thought Content: Thought content normal.         Judgment: Judgment normal.           * Discharge Condition: good  * Disposition: Home    Discharge Medications:  Current Discharge Medication List      START taking these medications    Details   oxyCODONE IR (ROXICODONE) 5 mg immediate release tablet Take 1 Tab by mouth every four (4) hours as needed for Pain for up to 5 days. Max Daily Amount: 30 mg.  Qty: 30 Tab, Refills: 0    Associated Diagnoses: Multiple fractures of ribs, left side, initial encounter for closed fracture         CONTINUE these medications which have NOT CHANGED    Details   metFORMIN (GLUCOPHAGE) 500 mg tablet Take 500 mg by mouth two (2) times daily (with meals). pioglitazone (Actos) 45 mg tablet Take 45 mg by mouth daily. ergocalciferol (Vitamin D2) 1,250 mcg (50,000 unit) capsule Take 50,000 Units by mouth every seven (7) days. carvediloL (COREG) 12.5 mg tablet Take 12.5 mg by mouth two (2) times daily (with meals). Indications: myocardial reinfarction prevention             * Follow-up Care/Patient Instructions:   Activity: No strenuous activity, patient advised not to return to work for 6 weeks  Diet: Regular Diet  Wound Care: None needed    Follow-up Information     Follow up With Specialties Details Why Contact Info    Sofia Holley MD Colon and Rectal Surgery, General Surgery On 3/3/2021  [address]  [phone]      UNKNOWN          35 minutes was spent in discharging the patient of which more than half was spent in counseling    Signed:  Barak Vieyra MD  2/14/2021  10:04 AM

## 2022-03-18 PROBLEM — S22.42XA MULTIPLE FRACTURES OF RIBS, LEFT SIDE, INITIAL ENCOUNTER FOR CLOSED FRACTURE: Status: ACTIVE | Noted: 2021-02-12

## 2023-05-14 RX ORDER — CARVEDILOL 12.5 MG/1
12.5 TABLET ORAL 2 TIMES DAILY WITH MEALS
COMMUNITY

## 2023-05-14 RX ORDER — PIOGLITAZONEHYDROCHLORIDE 45 MG/1
45 TABLET ORAL DAILY
COMMUNITY

## 2023-05-14 RX ORDER — ERGOCALCIFEROL 1.25 MG/1
50000 CAPSULE ORAL
COMMUNITY